# Patient Record
Sex: MALE | Race: WHITE | NOT HISPANIC OR LATINO | URBAN - METROPOLITAN AREA
[De-identification: names, ages, dates, MRNs, and addresses within clinical notes are randomized per-mention and may not be internally consistent; named-entity substitution may affect disease eponyms.]

---

## 2017-04-19 ENCOUNTER — APPOINTMENT (RX ONLY)
Dept: URBAN - METROPOLITAN AREA CLINIC 41 | Facility: CLINIC | Age: 42
Setting detail: DERMATOLOGY
End: 2017-04-19

## 2017-04-19 DIAGNOSIS — D22 MELANOCYTIC NEVI: ICD-10-CM

## 2017-04-19 DIAGNOSIS — Z41.9 ENCOUNTER FOR PROCEDURE FOR PURPOSES OTHER THAN REMEDYING HEALTH STATE, UNSPECIFIED: ICD-10-CM

## 2017-04-19 DIAGNOSIS — L91.8 OTHER HYPERTROPHIC DISORDERS OF THE SKIN: ICD-10-CM

## 2017-04-19 DIAGNOSIS — L73.8 OTHER SPECIFIED FOLLICULAR DISORDERS: ICD-10-CM

## 2017-04-19 DIAGNOSIS — L64.8 OTHER ANDROGENIC ALOPECIA: ICD-10-CM

## 2017-04-19 PROBLEM — D48.5 NEOPLASM OF UNCERTAIN BEHAVIOR OF SKIN: Status: ACTIVE | Noted: 2017-04-19

## 2017-04-19 PROBLEM — I10 ESSENTIAL (PRIMARY) HYPERTENSION: Status: ACTIVE | Noted: 2017-04-19

## 2017-04-19 PROCEDURE — ? MEDICAL CONSULTATION: DYNAMIC RHYTIDES

## 2017-04-19 PROCEDURE — ? OTHER

## 2017-04-19 PROCEDURE — 99202 OFFICE O/P NEW SF 15 MIN: CPT

## 2017-04-19 PROCEDURE — ? COUNSELING

## 2017-04-19 PROCEDURE — ? BIOPSY BY SHAVE METHOD (COSMETIC)

## 2017-04-19 PROCEDURE — ? PRODUCT LINE (PHARMACEUTICALS)

## 2017-04-19 PROCEDURE — ? BENIGN DESTRUCTION COSMETIC MULTI

## 2017-04-19 ASSESSMENT — LOCATION ZONE DERM
LOCATION ZONE: AXILLAE
LOCATION ZONE: TRUNK
LOCATION ZONE: FACE
LOCATION ZONE: NECK

## 2017-04-19 ASSESSMENT — LOCATION DETAILED DESCRIPTION DERM
LOCATION DETAILED: LEFT BUTTOCK
LOCATION DETAILED: RIGHT MEDIAL FOREHEAD
LOCATION DETAILED: RIGHT RIB CAGE
LOCATION DETAILED: RIGHT CLAVICULAR SKIN
LOCATION DETAILED: RIGHT AXILLARY VAULT

## 2017-04-19 ASSESSMENT — LOCATION SIMPLE DESCRIPTION DERM
LOCATION SIMPLE: RIGHT FOREHEAD
LOCATION SIMPLE: RIGHT ANTERIOR NECK
LOCATION SIMPLE: RIGHT AXILLARY VAULT
LOCATION SIMPLE: ABDOMEN
LOCATION SIMPLE: LEFT BUTTOCK

## 2017-04-19 NOTE — PROCEDURE: OTHER
Note Text (......Xxx Chief Complaint.): This diagnosis correlates with the
Other (Free Text): Discussed in detail the risks and benefits of PRP in combination with micro needling
Detail Level: Simple
Other (Free Text): Discussed the use of PRP and Microneedling. Patient is planning on a hair transplant but discussed these option as supplements or alternatives.

## 2017-04-19 NOTE — PROCEDURE: BENIGN DESTRUCTION COSMETIC MULTI
Detail Level: Detailed
Total Number Of Lesions Treated: 7
Anesthesia Type: 1% lidocaine with epinephrine
Anesthesia Volume In Cc: 1.2
Price (Use Numbers Only, No Special Characters Or $): 0
Consent: The patient's consent was obtained including but not limited to risks of crusting, scabbing, blistering, scarring, darker or lighter pigmentary change, recurrence, incomplete removal and infection.
Post-Care Instructions: I reviewed with the patient in detail post-care instructions. Patient is to wear sunprotection, and avoid picking at any of the treated lesions. Pt may apply Vaseline to crusted or scabbing areas.

## 2017-04-19 NOTE — PROCEDURE: BIOPSY BY SHAVE METHOD (COSMETIC)
Consent was obtained and risks were reviewed including but not limited to scarring, infection, bleeding, scabbing, incomplete removal, nerve damage and allergy to anesthesia.
X Size Of Lesion In Cm (Optional): 0
Detail Level: Detailed
Size Of Lesion In Cm (Optional): 0.3
Post-Care Instructions: I reviewed with the patient in detail post-care instructions. Patient is to keep the biopsy site dry overnight, and then apply bacitracin twice daily until healed. Patient may apply hydrogen peroxide soaks to remove any crusting.
Biopsy Method: Dermablade
Anesthesia Type: 1% lidocaine with epinephrine
Hemostasis: Drysol
Billing Type: Third-Party Bill
Biopsy Type: H and E
Price (Use Numbers Only, No Special Characters Or $): 250.00
Anesthesia Volume In Cc: 0.2
Notification Instructions: Patient will be notified of biopsy results. However, patient instructed to call the office if not contacted within 2 weeks.
Wound Care: Vaseline

## 2017-04-19 NOTE — PROCEDURE: PRODUCT LINE (PHARMACEUTICALS)
Product 7 Units: 0
Name Of Product 6: Rosacea Triple Gel
Product 31 Price (In Dollars - Numeric Only, No Special Characters Or $): 0.00
Product 5 Price (In Dollars - Numeric Only, No Special Characters Or $): 40.00
Product 10 Application Directions: Apply pea size amount to entire face nightly, if too drying may reduce to every other night. Patient may have 2 refills
Name Of Product 7: Seborrheic Dermatitis Cream
Product 3 Application Directions: Apply to affected areas 1-2 times daily as needed for flares
Name Of Product 3: Clobetasol Shampoo
Name Of Product 8: Tretinoin 0.025% Cream
Detail Level: Simple
Product 9 Application Directions: Apply to affected areas nightly. Patient may have 3 refills.
Product 12 Price (In Dollars - Numeric Only, No Special Characters Or $): 50.00
Product 12 Application Directions: Apply once daily to the eyelids as needed for irritation.
Name Of Product 10: Tretinoin 0.1% Cream
Product 1 Application Directions: Apply to the face once at night with moisturizer. Patient may have 3 refills
Product 2 Application Directions: Apply to affected areas 1-2 times daily as needed for flares. Patient may have 3 refills
Name Of Product 9: Tretinoin 0.05% Cream
Product 6 Application Directions: Apply to face once to twice daily as tolerated, if too irritating may reduce to every other night. Patient may have 6 refills.
Name Of Product 11: Clobetasol Cream
Product 13 Application Directions: Apply five times weekly for 6 weeks. No refills
Name Of Product 14: Azeloyl Hydrating Cream
Product 1 Price (In Dollars - Numeric Only, No Special Characters Or $): 40
Product 11 Application Directions: Apply to affected areas twice daily for 2-3 weeks and then as needed for flares. Patient may have 0 refills
Product 5 Application Directions: Apply to affected areas nightly as tolerated, if too drying reduce to every other night. Patient may have 3 refills.
Name Of Product 2: Clobetasol Foam
Product 8 Application Directions: Apply to affected areas nightly, if too irritating may reduce use to every other night. Patient may have 6 refills.
Name Of Product 13: Actinic Keratosis gel
Product 4 Application Directions: Apply to affected areas 2 times daily and then as needed for flares.
Product 14 Application Directions: Apply twice daily all over face with moisturizer. Pt. may have 11 refills.
Render Product Pricing In Note: Yes
Name Of Product 1: Acne Triple Gel
Name Of Product 4: Clobetasol Solution
Product 8 Units: 1
Name Of Product 5: Melasma Emulsion
Name Of Product 12: Tacrolimus ointment

## 2018-05-10 ENCOUNTER — APPOINTMENT (RX ONLY)
Dept: URBAN - METROPOLITAN AREA CLINIC 41 | Facility: CLINIC | Age: 43
Setting detail: DERMATOLOGY
End: 2018-05-10

## 2018-05-10 DIAGNOSIS — L73.8 OTHER SPECIFIED FOLLICULAR DISORDERS: ICD-10-CM

## 2018-05-10 PROCEDURE — ? COUNSELING

## 2018-05-10 PROCEDURE — ? TREATMENT REGIMEN

## 2018-05-10 PROCEDURE — ? PRODUCT LINE (PHARMACEUTICALS)

## 2018-05-10 PROCEDURE — 99213 OFFICE O/P EST LOW 20 MIN: CPT

## 2018-05-10 ASSESSMENT — LOCATION ZONE DERM: LOCATION ZONE: FACE

## 2018-05-10 ASSESSMENT — LOCATION SIMPLE DESCRIPTION DERM: LOCATION SIMPLE: INFERIOR FOREHEAD

## 2018-05-10 ASSESSMENT — LOCATION DETAILED DESCRIPTION DERM: LOCATION DETAILED: INFERIOR MID FOREHEAD

## 2018-05-10 NOTE — PROCEDURE: PRODUCT LINE (PHARMACEUTICALS)
Product 26 Price (In Dollars - Numeric Only, No Special Characters Or $): 0.00
Name Of Product 7: Seborrheic Dermatitis Cream
Product 10 Units: 0
Product 15 Price (In Dollars - Numeric Only, No Special Characters Or $): 50.00
Product 7 Price (In Dollars - Numeric Only, No Special Characters Or $): 40.00
Product 1 Price (In Dollars - Numeric Only, No Special Characters Or $): 50
Product 7 Application Directions: Apply to affected areas 1-2 times daily as needed for flares
Name Of Product 1: Acne Triple Gel
Product 4 Application Directions: Apply all over the AA on scalp and around ears twice daily on weekends for two more months. Patient may have 6 refills.
Product 16 Price (In Dollars - Numeric Only, No Special Characters Or $): 30.00
Product 6 Application Directions: Apply to face once to twice daily as tolerated, if too irritating may reduce to every other night. Patient may have 3 REFILLS
Name Of Product 13: Actinic Keratosis gel
Name Of Product 5: Melasma Emulsion
Name Of Product 9: Tretinoin 0.05% Cream
Product 8 Units: 1
Detail Level: Simple
Name Of Product 2: Clobetasol Foam
Product 14 Application Directions: Apply twice daily all over face with moisturizer. Pt. may have 11 refills.
Product 10 Application Directions: Apply pea size amount to entire face nightly, if too drying may reduce to every other night. Patient may have 3 REFILLS
Product 8 Application Directions: Apply to affected areas nightly, if too irritating may reduce use to every other night. Patient may have 6 REFILLS.
Product 5 Application Directions: Apply to affected areas nightly as tolerated, if too drying reduce to every other night. PATIENT MAY HAVE 3 REFILLS.
Name Of Product 14: Azeloyl Hydrating Cream
Product 2 Application Directions: Apply to affected areas 1-2 times daily as needed for flares. Patient may have 3 refills
Name Of Product 6: Rosacea Triple Gel
Name Of Product 15: Tazortene
Name Of Product 3: Clobetasol Shampoo
Name Of Product 11: Clobetasol Cream
Name Of Product 10: Tretinoin 0.1% Cream
Name Of Product 16: Fluocinonide cream
Name Of Product 4: Clobetasol Solution
Name Of Product 8: Tretinoin 0.025% Cream
Product 9 Application Directions: Apply a pea sized amount to entire face nightly. Patient may have 6 REFILLS
Product 15 Application Directions: Apply all over the face nightly with a moisturizer. Pt. may have 3 refills.
Product 1 Application Directions: Apply to the face once at night with moisturizer. Patient may have 3 refills
Product 13 Application Directions: Apply three times weekly for six weeks. No refills
Render Product Pricing In Note: Yes
Product 12 Application Directions: Apply once daily to the eyelids as needed for irritation.
Product 11 Application Directions: Apply to affected areas twice daily for 2-3 weeks and then as needed for flares. Refill x 3
Name Of Product 12: Tacrolimus ointment
Product 16 Application Directions: Apply to affected areas twice daily. Patient may have 3 REFILLS (if any left)
